# Patient Record
Sex: FEMALE | Race: WHITE
[De-identification: names, ages, dates, MRNs, and addresses within clinical notes are randomized per-mention and may not be internally consistent; named-entity substitution may affect disease eponyms.]

---

## 2018-03-15 ENCOUNTER — HOSPITAL ENCOUNTER (EMERGENCY)
Dept: HOSPITAL 88 - ER | Age: 58
LOS: 1 days | Discharge: HOME | End: 2018-03-16
Payer: COMMERCIAL

## 2018-03-15 VITALS — HEIGHT: 67 IN | BODY MASS INDEX: 32.8 KG/M2 | WEIGHT: 209 LBS

## 2018-03-15 DIAGNOSIS — E11.9: ICD-10-CM

## 2018-03-15 DIAGNOSIS — M25.511: Primary | ICD-10-CM
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
## 2018-03-15 DIAGNOSIS — S42.291A: ICD-10-CM
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
## 2018-03-15 PROCEDURE — 70450 CT HEAD/BRAIN W/O DYE: CPT

## 2018-03-15 PROCEDURE — 99283 EMERGENCY DEPT VISIT LOW MDM: CPT

## 2018-03-15 PROCEDURE — 72125 CT NECK SPINE W/O DYE: CPT

## 2018-03-15 PROCEDURE — 71045 X-RAY EXAM CHEST 1 VIEW: CPT

## 2018-03-16 VITALS — DIASTOLIC BLOOD PRESSURE: 77 MMHG | SYSTOLIC BLOOD PRESSURE: 138 MMHG

## 2018-03-16 NOTE — DIAGNOSTIC IMAGING REPORT
CHEST SINGLE (NOT PORTABLE), 3/16/2018 12:08 AM



Technique: CHEST SINGLE (NOT PORTABLE)

Comparison: None available.

Clinical history: Fall against the wall with right-sided shoulder pain



Findings:

Cardiomediastinal silhouette is within normal limits. No consolidation or

edema. No pleural effusion or pneumothorax.



Impression:

1. Lines/Tubes: None

2. No acute abnormality.



Signed by: Dr Sue Dominguez MD on 3/16/2018 1:44 AM

## 2018-03-16 NOTE — DIAGNOSTIC IMAGING REPORT
History: Fall, pain

Comparison studies:None



Technique:

Axial images were obtained from the brain and cervical spine.

Coronal and sagittal images reconstructed from the axial data.

Intravenous contrast: None



Findings:



Head CT:



Scalp/skull: 

No abnormalities. No fractures, blastic or lytic lesions.



Brain sulci: Appropriate for age.

Ventricles: Normal in size and configuration. No hydrocephalus.



Extra-axial spaces: 

No masses.  No fluid collections.



Parenchyma: 

No abnormal densities. 

No masses, hemorrhage, acute or chronic cortical vascular insults.



Sellar/suprasellar region: No abnormalities.

Craniocervical junction: Patent foramen magnum.  No Chiari one malformation.



Cervical spine CT:



Fractures: None.

Soft tissues: No gross abnormalities.



Atlantoaxial articulation: Intact.

Alignment: Normal lordosis. No scoliosis.

Cervicomedullary junction: No abnormalities. Patent foramen magnum.



Vertebrae: 

No infection or neoplasm.



Degenerative changes: 

None.



Incidental findings:

None.



Impression:



Head CT:

1.  Normal.





Cervical spine CT:

1.  No abnormalities.

2.  Cannot exclude ligament, spinal cord and or vascular abnormalities on the

basis of this examination.



Signed by: DR Joaquín Senior M.D. on 3/16/2018 1:44 AM

## 2018-03-16 NOTE — DIAGNOSTIC IMAGING REPORT
HUMERUS RIGHT 2+VIEWS



Comparison: None

Clinical history: Fall with right shoulder pain



Findings:

Mildly displaced spiral fracture of the proximal humeral diaphysis with greater

than half shaft width medial displacement. Small bone fragment adjacent to be

medial superior fracture line. A linear lucency overlies the greater

tuberosity, probable fracture.



Impression:

1. Mildly displaced proximal right humeral fracture.

2. Probable nondisplaced chip fracture of the greater tuberosity. Recommend

dedicated shoulder radiographs.



Signed by: Dr Sue Dominguez MD on 3/16/2018 1:48 AM

## 2018-04-03 ENCOUNTER — HOSPITAL ENCOUNTER (EMERGENCY)
Dept: HOSPITAL 88 - ER | Age: 58
Discharge: HOME | End: 2018-04-03
Payer: COMMERCIAL

## 2018-04-03 VITALS — HEIGHT: 67 IN | BODY MASS INDEX: 42.85 KG/M2 | WEIGHT: 273 LBS

## 2018-04-03 DIAGNOSIS — Z48.01: Primary | ICD-10-CM

## 2018-04-03 DIAGNOSIS — F17.210: ICD-10-CM

## 2018-04-03 PROCEDURE — 99283 EMERGENCY DEPT VISIT LOW MDM: CPT

## 2018-04-03 NOTE — XMS REPORT
Patient Summary Document

 Created on: 2018



DUSTIN ZEE

External Reference #: [id number]

: 1960

Sex: Female



Demographics







 Address  [address] [place] [address]

 

 Home Phone  [phone]

 

 Preferred Language  Unknown

 

 Marital Status  Unknown

 

 Jainism Affiliation  Unknown

 

 Race  Unknown

 

 Ethnic Group  Unknown





Author







 Author  [facility]

 

 Address  Unknown

 

 Phone  Unavailable







Care Team Providers







 Care Team Member Name  Role  Phone

 

 SWEET, LAIRD  Unavailable  Unavailable







Problems

This patient has no known problems.



Allergies, Adverse Reactions, Alerts

This patient has no known allergies or adverse reactions.



Medications

This patient has no known medications.



Results







 Test Description  Test Time  Test Comments  Text Results  Atomic Results  
Result Comments









 CT CERVICAL SPINE WO            [place] [address]      Patient Name: DUSTIN ZEE   MR #: [id number]    : 1960 Age/Sex: 58/F  Acct #: 
[id number] Req #: [id number]  Adm Physician:     Ordered by: JAROD HARMAN MD  Report #: [id number]   Location: ER  Room/Bed:     __________________________
_________________________________________________________________________    
Procedure: [id number] CT/CT CERVICAL SPINE WO  Exam Date:                       
      Exam Time:        REPORT STATUS: Signed    History: Fall, pain   
Comparison studies:None      Technique:   Axial images were obtained from the 
brain and cervical spine.   Coronal and sagittal images reconstructed from the 
axial data.   Intravenous contrast: None      Findings:      Head CT:      Scalp
/skull:    No abnormalities. No fractures, blastic or lytic lesions.      Brain 
sulci: Appropriate for age.   Ventricles: Normal in size and configuration. No 
hydrocephalus.      Extra-axial spaces:    No masses.  No fluid collections.   
   Parenchyma:    No abnormal densities.    No masses, hemorrhage, acute or 
chronic cortical vascular insults.      Sellar/suprasellar region: No 
abnormalities.   Craniocervical junction: Patent foramen magnum.  No Chiari one 
malformation.      Cervical spine CT:      Fractures: None.   Soft tissues: No 
gross abnormalities.      Atlantoaxial articulation: Intact.   Alignment: 
Normal lordosis. No scoliosis.   Cervicomedullary junction: No abnormalities. 
Patent foramen magnum.      Vertebrae:    No infection or neoplasm.      
Degenerative changes:    None.      Incidental findings:   None.      Impression
:      Head CT:   1.  Normal.         Cervical spine CT:   1.  No 
abnormalities.   2.  Cannot exclude ligament, spinal cord and or vascular 
abnormalities on the   basis of this examination.      Signed by: DR Joaquín Senior M.D. on 3/16/2018 1:44 AM        Dictated By: JOAQUÍN SILVA MD  Electronically Signed By: JOAQUÍN SILVA MD on 18  
Transcribed By: ANNABELLA on 18       COPY TO:   JAROD HARMAN MD     
      

 

 CT BRAIN WO            [place] [address]      Patient Name: DUSTIN ZEE
   MR #: [id number]    : 1960 Age/Sex: 58/F  Acct #: [id number] Req #
: [id number]  Adm Physician:     Ordered by: JAROD HARMAN MD  Report #: [id number]
[id number]   Location: ER  Room/Bed:     _____________________________________________
______________________________________________________    Procedure: [id number] 
CT/CT BRAIN WO  Exam Date:                             Exam Time:        REPORT 
STATUS: Signed    History: Fall, pain   Comparison studies:None      Technique:
   Axial images were obtained from the brain and cervical spine.   Coronal and 
sagittal images reconstructed from the axial data.   Intravenous contrast: None
      Findings:      Head CT:      Scalp/skull:    No abnormalities. No 
fractures, blastic or lytic lesions.      Brain sulci: Appropriate for age.   
Ventricles: Normal in size and configuration. No hydrocephalus.      Extra-
axial spaces:    No masses.  No fluid collections.      Parenchyma:    No 
abnormal densities.    No masses, hemorrhage, acute or chronic cortical 
vascular insults.      Sellar/suprasellar region: No abnormalities.   
Craniocervical junction: Patent foramen magnum.  No Chiari one malformation.   
   Cervical spine CT:      Fractures: None.   Soft tissues: No gross 
abnormalities.      Atlantoaxial articulation: Intact.   Alignment: Normal 
lordosis. No scoliosis.   Cervicomedullary junction: No abnormalities. Patent 
foramen magnum.      Vertebrae:    No infection or neoplasm.      Degenerative 
changes:    None.      Incidental findings:   None.      Impression:      Head 
CT:   1.  Normal.         Cervical spine CT:   1.  No abnormalities.   2.  
Cannot exclude ligament, spinal cord and or vascular abnormalities on the   
basis of this examination.      Signed by: DR Joaquín Senior M.D. on 3/16
/2018 1:44 AM        Dictated By: JOAQUÍN SILVA MD  Electronically Signed 
By: JOAQUÍN SILVA MD on 18  Transcribed By: ANNABELLA on 18       COPY TO:   JAROD HARMAN MD           

 

 CHEST SINGLE (NOT PORTABLE)            [place] [address]      Patient Name: 
DUSTIN ZEE   MR #: [id number]    : 1960 Age/Sex: 58/F  Acct #: 
[id number] Req #: [id number]  Adm Physician:     Ordered by: JAROD HARMAN MD  Report #: [id number]   Location: ER  Room/Bed:     __________________________
_________________________________________________________________________    
Procedure: [id number] DX/CHEST SINGLE (NOT PORTABLE)  Exam Date:                
             Exam Time:        REPORT STATUS: Signed    CHEST SINGLE (NOT 
PORTABLE), 3/16/2018 12:08 AM      Technique: CHEST SINGLE (NOT PORTABLE)   
Comparison: None available.   Clinical history: Fall against the wall with right
-sided shoulder pain      Findings:   Cardiomediastinal silhouette is within 
normal limits. No consolidation or   edema. No pleural effusion or 
pneumothorax.      Impression:   1. Lines/Tubes: None   2. No acute 
abnormality.      Signed by: Dr Sally Dominguez MD on 3/16/2018 1:44 AM        
Dictated By: SALLY DOMINGUEZ MD  Electronically Signed By: SALLY DOMINGUEZ MD 
on 18  Transcribed By: ANNABELLA on 18       COPY TO:   
JAROD HARMAN MD           

 

 HUMERUS RIGHT 2+VIEWS            [place] [address]      Patient Name: DUSTIN ZEE   MR #: [id number]    : 1960 Age/Sex: 58/F  Acct #: 
[id number] Req #: [id number]  Adm Physician:     Ordered by: JAROD HARMAN MD  Report #: [id number]   Location: ER  Room/Bed:     __________________________
_________________________________________________________________________    
Procedure: [id number] DX/HUMERUS RIGHT 2+VIEWS  Exam Date:                      
       Exam Time:        REPORT STATUS: Signed    HUMERUS RIGHT 2+VIEWS      
Comparison: None   Clinical history: Fall with right shoulder pain      Findings
:   Mildly displaced spiral fracture of the proximal humeral diaphysis with 
greater   than half shaft width medial displacement. Small bone fragment 
adjacent to be   medial superior fracture line. A linear lucency overlies the 
greater   tuberosity, probable fracture.      Impression:   1. Mildly displaced 
proximal right humeral fracture.   2. Probable nondisplaced chip fracture of 
the greater tuberosity. Recommend   dedicated shoulder radiographs.      Signed 
by: Dr Sally Dominguez MD on 3/16/2018 1:48 AM        Dictated By: SALLY DOMINGUEZ MD  Electronically Signed By: SALLY DOMINGUEZ MD on 18  
Transcribed By: ANNABELLA on 18       COPY TO:   JAROD HARMAN MD

## 2018-04-03 NOTE — XMS REPORT
Continuity of Care Document

 Created on: 2018



PENNIE KNAPP

External Reference #: [id number]

: 1960

Sex: Female



Demographics







 Address  [address] [place]  [address]

 

 Home Phone  [phone]

 

 Preferred Language  Unknown

 

 Marital Status  Unknown

 

 Congregation Affiliation  Unknown

 

 Race  White

 

 Ethnic Group  Unknown





Author







 Author  [facility]

 

 Organization  [facility]

 

 Address  [address]

[place]  [address]



 

 Phone  Unavailable







Support







 Name  Relationship  Address  Phone

 

 NELDA DANDY (NON STAFF)  Caregiver  [address]  [address] [phone]

 

 DANDY LUTZ (NON STAFF)  Caregiver  [address]  [address] [phone]

 

 JAROD HARMAN MD  Caregiver  [address]

[place]  [address]  Unavailable

 

 MERE KNAPP  Next Of Kin  [address] [place], [place]  [id number] [phone]







Care Team Providers







 Care Team Member Name  Role  Phone

 

 DANDY LUTZ (NON STAFF)  PCP  [phone]







Insurance Providers







 Guarantor  Tevin Knappy

 

 Address  [address] [place] [address]

 

 Phone  [phone]











 Payer  [facility]

 

 Policy Number  [id number]

 

 Subscriber's Name  Pennie Knapp

 

 Relationship  18 Self / Same As Patient







Advance Directives







 Directive  Response  Recorded Date/Time

 

 Does the patient have an advance directive?  No  12 10:36am

 

 If yes, [facility]  No  12 10:36am

 

 If not on file with [facility] will patient provide a copy?  Yes  18 12:21am

 

 Do you have a Directive to Physician?  No  18 12:21am

 

 Do you have a Medical Power of ?  No  18 12:21am

 

 Do you have an out of hospital Do Not Resuscitate Order?  No  18 12:21am

 

 Do you have any special needs we should be aware of?  No  18 12:21am

 

 Do you have a support person here with you today?  Yes  18 12:21am

 

 Did patient receive Notice of Privacy Practices?  Yes  18 12:21am

 

 Did patient receive patient rights and responsibilities?  Yes  18 12:21am







Problems

No problem information available.



Medications





Current Home Medications





 Medication  Dose  Units  Route  Directions  Days  Qty  Instructions  Start Date

 

 Esomeprazole Mag Trihydrate (Nexium) 40 Mg Capsule.dr  40  Mg  Oral  Daily    
        

 

 Ibuprofen (Motrin) 800 Mg Tablet  800  Mg  Oral  as needed            







Social History

No social history information available.



Hospital Discharge Instructions

No hospital discharge instruction information available.



Plan of Care







 Discharge Date  18 3:17am

 

 Disposition  HOME, SELF-CARE

 

 Condition at Discharge  Stable

 

 Instructions/Education Provided  Fractures - Humerus

 

 Prescriptions  See Medication Section

 

 Referrals  DANDY LUTZ (NON STAFF)

Address:

 [address] [place] [phone]

 

 Additional Instructions/Education  REST;



TAKE MEDICATIONS AS PRESCRIBED;



FOLLOW UP WITH ORTHO;







Functional Status

No functional status information available.



Allergies, Adverse Reactions, Alerts

No known allergies.



Immunizations

No immunization information available.



Vital Signs





Acute Vital Signs





 Vital  Response  Date/Time

 

 Height  5 ft 7 in  2018 12:02am

 

 Weight  209 lb  2018 12:02am

 

 Body Mass Index  32.7 kg/m^2  2018 12:02am







Results

No relevant diagnostic test, laboratory data and/or discharge summary 
information available.



Procedures







 Procedure  Status  Date  Provider(s)

 

 Computed tomography of brain without radiopaque contrast  Active  18  
JAROD HARMAN MD

 

 Computed tomography of cervical spine without contrast  Active  18  JAROD HARMAN MD

 

 X-ray of chest, single view  Active  18  JAROD HARMAN MD







Encounters







 Encounter  Location  Arrival/Admit Date  Discharge/Depart Date  Attending 
Provider

 

 Departed Emergency Room  [facility]  03/15/18 11:43pm   3:17am  JAROD HARMAN MD